# Patient Record
Sex: MALE | Race: WHITE | NOT HISPANIC OR LATINO | Employment: OTHER | URBAN - METROPOLITAN AREA
[De-identification: names, ages, dates, MRNs, and addresses within clinical notes are randomized per-mention and may not be internally consistent; named-entity substitution may affect disease eponyms.]

---

## 2017-05-10 NOTE — PATIENT DISCUSSION
ELLIOTT OU:  PRESCRIBED UV PROTECTION TO SLOW GROWTH. PRESCRIBE ARTIFICAL TEARS TO INCREASE COMFORT.

## 2017-06-06 NOTE — PATIENT DISCUSSION
CONJUNCTIVITIS (VIRAL), OU: PRESCRIBE ARTIFICIAL TEARS AND COOL COMPRESSES. PATIENT PREFERS TO NOT TREAT FOR NOW IF NOT NECESSARY. WILL CALL IF SYMPTOMS WORSEN OR DO NOT IMPROVE WITHIN 5 DAYS. RETURN FOR FOLLOW-UP AS SCHEDULED.

## 2019-05-17 NOTE — PATIENT DISCUSSION
05/22/2017Acuvue 2 (8.7)OS+2. [id number]&nbsp; &nbsp; J7&nbsp;JG brunak
Armen VAOU20/30&nbsp;SN &nbsp; J7&nbsp;JG brunak
CONTACT LENS FITTING: PATIENT INSTRUCTED IN INSERTION AND REMOVAL. PATIENT ABLE TO INSERT AND REMOVE CONTACTS WITHOUT DIFFICULTY. PATIENT IS HAPPY WITH VISION AND COMFORT. PATIENT INSTRUCTED ON PROPER CONTACT LENS WEAR AND CARE. CONTACT LENS FIT WELL. PATIENT SENT HOME WITH SAMPLE CONTACTS, CONTACT LENS KIT AND RX.
Contact Lens Prescribed:
FinalAcuvue 2 (8.7)OD-[id number]/30 -&nbsp;SN &nbsp; &nbsp; clk
Focal Point:ODDistance
General:
Lens Disposal Schedule:2 Weeks
OS4&nbsp;Box(es)
OSDistance
Quantity:OD4&nbsp;Box(es)
lens makespherecylinderaxisaddBCDIAMVAInt VANVExaminer
DISCHARGE

## 2019-08-14 NOTE — PATIENT DISCUSSION
08/01/20191-day Acuvue MoistOS+2. [id number]&nbsp; &nbsp; &nbsp; pmh no abrasions, no jaundice, no lesions, no pruritis, and no rashes.

## 2022-10-13 ENCOUNTER — NEW PATIENT (OUTPATIENT)
Dept: URBAN - METROPOLITAN AREA CLINIC 52 | Facility: CLINIC | Age: 57
End: 2022-10-13

## 2022-10-13 DIAGNOSIS — H43.811: ICD-10-CM

## 2022-10-13 DIAGNOSIS — H25.12: ICD-10-CM

## 2022-10-13 PROCEDURE — 92004 COMPRE OPH EXAM NEW PT 1/>: CPT

## 2022-10-13 ASSESSMENT — VISUAL ACUITY
OS_PH: 20/30+2
OD_SC: 20/30-1
OS_SC: 20/60-1
OS_GLARE: 20/50
OS_GLARE: 20/40
OD_PH: 20/25

## 2022-10-13 ASSESSMENT — KERATOMETRY
OD_K1POWER_DIOPTERS: 44.25
OD_K2POWER_DIOPTERS: 44.25
OS_AXISANGLE2_DEGREES: 166
OD_AXISANGLE2_DEGREES: 90
OS_K1POWER_DIOPTERS: 43.25
OS_AXISANGLE_DEGREES: 76
OS_K2POWER_DIOPTERS: 43.75
OD_AXISANGLE_DEGREES: 0

## 2022-10-13 ASSESSMENT — TONOMETRY
OS_IOP_MMHG: 13
OD_IOP_MMHG: 13

## 2022-11-11 ENCOUNTER — PRE-OP/H&P (OUTPATIENT)
Dept: URBAN - METROPOLITAN AREA CLINIC 49 | Facility: CLINIC | Age: 57
End: 2022-11-11

## 2022-11-11 DIAGNOSIS — H43.811: ICD-10-CM

## 2022-11-11 DIAGNOSIS — H25.12: ICD-10-CM

## 2022-11-11 PROCEDURE — 92134 CPTRZ OPH DX IMG PST SGM RTA: CPT

## 2022-11-11 PROCEDURE — PREOP PRE OP VISIT

## 2022-11-11 PROCEDURE — 92136 OPHTHALMIC BIOMETRY: CPT

## 2022-11-11 PROCEDURE — 92025CV CORNEAL TOPOGRAPHY, CV

## 2022-11-11 ASSESSMENT — KERATOMETRY
OD_K2POWER_DIOPTERS: 43.00
OD_AXISANGLE_DEGREES: 022
OD_AXISANGLE2_DEGREES: 112
OS_K1POWER_DIOPTERS: 43.37
OS_AXISANGLE_DEGREES: 157
OS_AXISANGLE2_DEGREES: 67
OD_K1POWER_DIOPTERS: 43.50
OS_K2POWER_DIOPTERS: 42.87

## 2022-11-11 ASSESSMENT — TONOMETRY
OS_IOP_MMHG: 15
OD_IOP_MMHG: 15

## 2022-11-11 ASSESSMENT — VISUAL ACUITY
OD_SC: 20/40
OS_SC: 20/60-1

## 2022-11-16 ENCOUNTER — POST-OP (OUTPATIENT)
Dept: URBAN - METROPOLITAN AREA CLINIC 52 | Facility: CLINIC | Age: 57
End: 2022-11-16

## 2022-11-16 ENCOUNTER — SURGERY/PROCEDURE (OUTPATIENT)
Dept: URBAN - METROPOLITAN AREA SURGERY 16 | Facility: SURGERY | Age: 57
End: 2022-11-16

## 2022-11-16 DIAGNOSIS — Z96.1: ICD-10-CM

## 2022-11-16 DIAGNOSIS — H25.12: ICD-10-CM

## 2022-11-16 DIAGNOSIS — Z98.42: ICD-10-CM

## 2022-11-16 PROCEDURE — 99199PCV CUSTOM VISION

## 2022-11-16 PROCEDURE — 66984CV REMOVE CATARACT, INSERT LENS, CUSTOM VISION

## 2022-11-16 ASSESSMENT — VISUAL ACUITY
OS_SC: 20/60
OS_PH: 20/40

## 2022-11-16 ASSESSMENT — KERATOMETRY
OD_AXISANGLE2_DEGREES: 112
OS_AXISANGLE2_DEGREES: 67
OS_K1POWER_DIOPTERS: 43.37
OD_K2POWER_DIOPTERS: 43.00
OS_K2POWER_DIOPTERS: 42.87
OD_AXISANGLE2_DEGREES: 112
OD_AXISANGLE_DEGREES: 022
OS_AXISANGLE_DEGREES: 157
OS_K1POWER_DIOPTERS: 43.37
OS_K2POWER_DIOPTERS: 42.87
OD_K1POWER_DIOPTERS: 43.50
OD_K1POWER_DIOPTERS: 43.50
OD_K2POWER_DIOPTERS: 43.00
OS_AXISANGLE_DEGREES: 157
OD_AXISANGLE_DEGREES: 022
OS_AXISANGLE2_DEGREES: 67

## 2022-11-16 ASSESSMENT — TONOMETRY
OS_IOP_MMHG: 33
OS_IOP_MMHG: 13

## 2022-11-23 ENCOUNTER — POST-OP (OUTPATIENT)
Dept: URBAN - METROPOLITAN AREA CLINIC 52 | Facility: CLINIC | Age: 57
End: 2022-11-23

## 2022-11-23 DIAGNOSIS — Z98.42: ICD-10-CM

## 2022-11-23 DIAGNOSIS — Z96.1: ICD-10-CM

## 2022-11-23 ASSESSMENT — VISUAL ACUITY
OD_SC: 20/30
OU_SC: 20/25
OD_PH: 20/25-1
OS_SC: J10@14IN
OS_SC: 20/25-1
OS_SC: J4@16IN

## 2022-11-23 ASSESSMENT — TONOMETRY
OS_IOP_MMHG: 15
OD_IOP_MMHG: 15

## 2022-12-15 ENCOUNTER — POST-OP (OUTPATIENT)
Dept: URBAN - METROPOLITAN AREA CLINIC 52 | Facility: CLINIC | Age: 57
End: 2022-12-15

## 2022-12-15 PROCEDURE — 92015 DETERMINE REFRACTIVE STATE: CPT

## 2022-12-15 PROCEDURE — 99024 POSTOP FOLLOW-UP VISIT: CPT

## 2022-12-15 ASSESSMENT — VISUAL ACUITY
OU_SC: 20/20
OU_SC: J5@16"
OD_SC: 20/25-1
OU_SC: 20/25@24"
OS_SC: 20/20-1

## 2022-12-15 ASSESSMENT — TONOMETRY
OS_IOP_MMHG: 16
OD_IOP_MMHG: 16

## 2023-04-20 ENCOUNTER — FOLLOW UP (OUTPATIENT)
Dept: URBAN - METROPOLITAN AREA CLINIC 52 | Facility: CLINIC | Age: 58
End: 2023-04-20

## 2023-04-20 DIAGNOSIS — H43.811: ICD-10-CM

## 2023-04-20 PROCEDURE — 92134 CPTRZ OPH DX IMG PST SGM RTA: CPT

## 2023-04-20 PROCEDURE — 92014 COMPRE OPH EXAM EST PT 1/>: CPT

## 2023-04-20 ASSESSMENT — VISUAL ACUITY
OS_GLARE: 20/30
OS_GLARE: 20/20-1
OS_SC: 20/20-1
OD_PH: 20/25
OD_SC: 20/30
OU_SC: J3@16
OU_SC: 20/20@24"
OU_SC: 20/20

## 2023-04-20 ASSESSMENT — TONOMETRY
OD_IOP_MMHG: 16
OS_IOP_MMHG: 16

## 2024-05-15 ENCOUNTER — COMPREHENSIVE EXAM (OUTPATIENT)
Dept: URBAN - METROPOLITAN AREA CLINIC 52 | Facility: CLINIC | Age: 59
End: 2024-05-15

## 2024-05-15 DIAGNOSIS — H43.811: ICD-10-CM

## 2024-05-15 PROCEDURE — 99214 OFFICE O/P EST MOD 30 MIN: CPT

## 2024-05-15 ASSESSMENT — VISUAL ACUITY
OU_SC: 20/20
OS_SC: 20/25
OD_GLARE: 20/25-2
OS_GLARE: 20/25
OD_SC: 20/25-1
OU_SC: 20/25-1
OD_GLARE: 20/25
OS_GLARE: 20/25

## 2024-05-15 ASSESSMENT — KERATOMETRY
OS_AXISANGLE_DEGREES: 0
OD_K1POWER_DIOPTERS: 44.25
OS_K1POWER_DIOPTERS: 43.75
OD_K2POWER_DIOPTERS: 44.25
OD_AXISANGLE_DEGREES: 0
OS_K2POWER_DIOPTERS: 43.75
OS_AXISANGLE2_DEGREES: 90
OD_AXISANGLE2_DEGREES: 90

## 2024-05-15 ASSESSMENT — TONOMETRY
OD_IOP_MMHG: 16
OS_IOP_MMHG: 14

## 2025-04-23 ENCOUNTER — COMPREHENSIVE EXAM (OUTPATIENT)
Age: 60
End: 2025-04-23

## 2025-04-23 DIAGNOSIS — H26.492: ICD-10-CM

## 2025-04-23 DIAGNOSIS — H43.811: ICD-10-CM

## 2025-04-23 PROCEDURE — 92014 COMPRE OPH EXAM EST PT 1/>: CPT
